# Patient Record
Sex: FEMALE | Race: WHITE | Employment: UNEMPLOYED | URBAN - METROPOLITAN AREA
[De-identification: names, ages, dates, MRNs, and addresses within clinical notes are randomized per-mention and may not be internally consistent; named-entity substitution may affect disease eponyms.]

---

## 2018-06-13 ENCOUNTER — HOSPITAL ENCOUNTER (OUTPATIENT)
Dept: MAMMOGRAPHY | Age: 33
Discharge: HOME | End: 2018-06-13
Attending: OBSTETRICS & GYNECOLOGY
Payer: COMMERCIAL

## 2018-06-13 DIAGNOSIS — N63.0 LUMP, BREAST: ICD-10-CM

## 2018-06-13 PROCEDURE — 77066 DX MAMMO INCL CAD BI: CPT

## 2018-06-13 PROCEDURE — 76642 ULTRASOUND BREAST LIMITED: CPT

## 2018-06-25 ENCOUNTER — HOSPITAL ENCOUNTER (OUTPATIENT)
Dept: MAMMOGRAPHY | Age: 33
Discharge: HOME | End: 2018-06-25
Attending: OBSTETRICS & GYNECOLOGY
Payer: COMMERCIAL

## 2018-06-25 VITALS — DIASTOLIC BLOOD PRESSURE: 85 MMHG | HEART RATE: 101 BPM | SYSTOLIC BLOOD PRESSURE: 131 MMHG

## 2018-06-25 DIAGNOSIS — N63.0 BREAST MASS: ICD-10-CM

## 2018-06-25 DIAGNOSIS — N63.20 LEFT BREAST MASS: ICD-10-CM

## 2018-06-25 PROCEDURE — 19083 BX BREAST 1ST LESION US IMAG: CPT

## 2018-06-25 PROCEDURE — 77065 DX MAMMO INCL CAD UNI: CPT

## 2018-06-25 PROCEDURE — 74011000250 HC RX REV CODE- 250: Performed by: OBSTETRICS & GYNECOLOGY

## 2018-06-25 PROCEDURE — 88305 TISSUE EXAM BY PATHOLOGIST: CPT | Performed by: OBSTETRICS & GYNECOLOGY

## 2018-06-25 RX ORDER — LIDOCAINE HYDROCHLORIDE 20 MG/ML
3 INJECTION, SOLUTION INFILTRATION; PERINEURAL
Status: COMPLETED | OUTPATIENT
Start: 2018-06-25 | End: 2018-06-25

## 2018-06-25 RX ADMIN — LIDOCAINE HYDROCHLORIDE 60 MG: 20 INJECTION, SOLUTION INFILTRATION; PERINEURAL at 10:18

## 2018-10-15 ENCOUNTER — HOSPITAL ENCOUNTER (OUTPATIENT)
Dept: MAMMOGRAPHY | Age: 33
Discharge: HOME | End: 2018-10-15
Attending: NURSE PRACTITIONER
Payer: COMMERCIAL

## 2018-10-15 DIAGNOSIS — N64.4 BREAST PAIN, RIGHT: ICD-10-CM

## 2018-10-15 DIAGNOSIS — N63.0 BREAST LUMP: ICD-10-CM

## 2018-10-15 PROCEDURE — 77066 DX MAMMO INCL CAD BI: CPT

## 2018-10-15 PROCEDURE — 76642 ULTRASOUND BREAST LIMITED: CPT

## 2021-12-17 ENCOUNTER — HOSPITAL ENCOUNTER (OUTPATIENT)
Dept: MAMMOGRAPHY | Age: 36
Discharge: HOME | End: 2021-12-17
Attending: OBSTETRICS & GYNECOLOGY
Payer: COMMERCIAL

## 2021-12-17 DIAGNOSIS — N63.20 LEFT BREAST LUMP: ICD-10-CM

## 2021-12-17 PROCEDURE — 76642 ULTRASOUND BREAST LIMITED: CPT

## 2021-12-17 PROCEDURE — 77066 DX MAMMO INCL CAD BI: CPT

## 2023-10-16 SDOH — ECONOMIC STABILITY: FOOD INSECURITY: WITHIN THE PAST 12 MONTHS, YOU WORRIED THAT YOUR FOOD WOULD RUN OUT BEFORE YOU GOT MONEY TO BUY MORE.: NEVER TRUE

## 2023-10-16 SDOH — ECONOMIC STABILITY: FOOD INSECURITY: WITHIN THE PAST 12 MONTHS, THE FOOD YOU BOUGHT JUST DIDN'T LAST AND YOU DIDN'T HAVE MONEY TO GET MORE.: NEVER TRUE

## 2023-10-16 SDOH — ECONOMIC STABILITY: INCOME INSECURITY: HOW HARD IS IT FOR YOU TO PAY FOR THE VERY BASICS LIKE FOOD, HOUSING, MEDICAL CARE, AND HEATING?: NOT HARD AT ALL

## 2023-10-16 SDOH — ECONOMIC STABILITY: HOUSING INSECURITY
IN THE LAST 12 MONTHS, WAS THERE A TIME WHEN YOU DID NOT HAVE A STEADY PLACE TO SLEEP OR SLEPT IN A SHELTER (INCLUDING NOW)?: NO

## 2023-10-16 SDOH — ECONOMIC STABILITY: TRANSPORTATION INSECURITY
IN THE PAST 12 MONTHS, HAS LACK OF TRANSPORTATION KEPT YOU FROM MEETINGS, WORK, OR FROM GETTING THINGS NEEDED FOR DAILY LIVING?: NO

## 2023-10-18 ENCOUNTER — OFFICE VISIT (OUTPATIENT)
Dept: OBGYN CLINIC | Age: 38
End: 2023-10-18
Payer: COMMERCIAL

## 2023-10-18 VITALS
SYSTOLIC BLOOD PRESSURE: 110 MMHG | WEIGHT: 111.5 LBS | HEIGHT: 65 IN | BODY MASS INDEX: 18.58 KG/M2 | DIASTOLIC BLOOD PRESSURE: 62 MMHG

## 2023-10-18 DIAGNOSIS — Z01.419 WELL WOMAN EXAM: Primary | ICD-10-CM

## 2023-10-18 DIAGNOSIS — Z12.4 SCREENING FOR MALIGNANT NEOPLASM OF CERVIX: ICD-10-CM

## 2023-10-18 DIAGNOSIS — Z11.51 SCREENING FOR HUMAN PAPILLOMAVIRUS (HPV): ICD-10-CM

## 2023-10-18 DIAGNOSIS — Z13.89 SCREENING FOR GENITOURINARY CONDITION: ICD-10-CM

## 2023-10-18 LAB
BILIRUBIN, URINE, POC: NEGATIVE
BLOOD URINE, POC: NEGATIVE
GLUCOSE URINE, POC: NEGATIVE
KETONES, URINE, POC: NEGATIVE
LEUKOCYTE ESTERASE, URINE, POC: NORMAL
NITRITE, URINE, POC: NEGATIVE
PH, URINE, POC: 7 (ref 4.6–8)
PROTEIN,URINE, POC: NEGATIVE
SPECIFIC GRAVITY, URINE, POC: 1.02 (ref 1–1.03)
URINALYSIS CLARITY, POC: CLEAR
URINALYSIS COLOR, POC: YELLOW
UROBILINOGEN, POC: NORMAL

## 2023-10-18 PROCEDURE — 81002 URINALYSIS NONAUTO W/O SCOPE: CPT | Performed by: OBSTETRICS & GYNECOLOGY

## 2023-10-18 PROCEDURE — 99395 PREV VISIT EST AGE 18-39: CPT | Performed by: OBSTETRICS & GYNECOLOGY

## 2023-10-18 NOTE — PROGRESS NOTES
HPI: Ms. Blue Swartz is a 45 y.o. female No obstetric history on file. who is here today for a well woman exam. She complains of nothing. They are fostering to adopt a 3yo. She is overall doing well. Has seen pcp and had annual exam this year to prepare for adoption. 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 GYN History     Patient's last menstrual period was 2023 (exact date). Cycle Length 28 Lasting 5-7  mild dysmenorrhea; negative postcoital bleeding    Sexually active using partner vasectomy for birth control     Past Medical History:   Diagnosis Date    Abnormal Papanicolaou smear of cervix     CAD (coronary artery disease)     Gluten intolerance     Thrombocytopenia ([address]) 12/3/2014     Past Surgical History:   Procedure Laterality Date    BREAST BIOPSY Left 2018    Fibrocystic breast disease    CHOLECYSTECTOMY  2013    OTHER SURGICAL HISTORY      colon bx     US BREAST BIOPSY W LOC DEVICE 1ST LESION LEFT Left 2018    US BREAST NEEDLE BIOPSY LEFT 2018 [facility]    WISDOM TOOTH EXTRACTION         Allergies   Allergen Reactions    Wheat Bran Nausea And Vomiting       No current outpatient medications on file. No current facility-administered medications for this visit.          Social History     Socioeconomic History    Marital status:  -15 years      Spouse name: Not on file    Number of children: Not on file    Years of education: Not on file    Highest education level: Not on file

## 2023-10-27 LAB
CYTOLOGIST CVX/VAG CYTO: NORMAL
CYTOLOGY CVX/VAG DOC THIN PREP: NORMAL
HPV APTIMA: NEGATIVE
Lab: NORMAL
Lab: NORMAL
PATH REPORT.FINAL DX SPEC: NORMAL
STAT OF ADQ CVX/VAG CYTO-IMP: NORMAL

## 2025-01-13 NOTE — PROGRESS NOTES
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   Tobacco Use    Smoking status: Never    Smokeless tobacco: Never   Vaping Use    Vaping status: Never Used   Substance and Sexual Activity    Alcohol use: Yes    Drug use: No    Sexual activity: Yes     Birth control/protection: None     Comment:  had a Vasectomy   Other Topics Concern    Exercise:     Social History Narrative    Not on file     Social Determinants of Health     Financial Resource Strain: Not on file   Food Insecurity: Not on file (10/16/2023)   Transportation Needs: Not on file   Physical Activity: Not on file   Stress: Not on file   Social Connections: Unknown (3/20/2021)    Received from [facility]    Social Connections     Frequency of Communication with Friends and Family: Not asked     Frequency of Social Gatherings with Friends and Family: Not asked   Intimate Partner Violence: Unknown (3/20/2021)    Received from [facility]    Intimate Partner Violence     Fear of Current or Ex-Partner: Not asked     Emotionally Abused: Not asked     Physically Abused: Not asked     Sexually Abused: Not asked   Housing Stability: Not on file     Family History   Problem Relation Age of Onset    No Known Problems Paternal Grandmother     No Known Problems Paternal Grandfather     Dementia Maternal Grandmother         Possibility    Stroke Father     No Known Problems Mother     No Known Problems Maternal Grandfather        Review of Systems  Neg except hpi        /72   Ht 1.651 m (5' 5\")   Wt 52.3 kg (115 lb 6.4 oz)   LMP 12/27/2024   BMI 19.20 kg/m²        Physical Exam  Constitutional:       General: She is not in acute distress.     Appearance: She is well-developed.   HENT:      Head: Normocephalic and atraumatic.   Neck:      Thyroid: No thyroid mass or thyromegaly.   Cardiovascular:      Rate and Rhythm: Normal rate and regular rhythm.

## 2025-01-14 ENCOUNTER — OFFICE VISIT (OUTPATIENT)
Dept: OBGYN CLINIC | Age: 40
End: 2025-01-14
Payer: COMMERCIAL

## 2025-01-14 VITALS
SYSTOLIC BLOOD PRESSURE: 100 MMHG | BODY MASS INDEX: 19.22 KG/M2 | WEIGHT: 115.4 LBS | DIASTOLIC BLOOD PRESSURE: 72 MMHG | HEIGHT: 65 IN

## 2025-01-14 DIAGNOSIS — Z12.31 SCREENING MAMMOGRAM FOR BREAST CANCER: ICD-10-CM

## 2025-01-14 DIAGNOSIS — Z01.419 WELL WOMAN EXAM: Primary | ICD-10-CM

## 2025-01-14 DIAGNOSIS — Z11.51 SCREENING FOR HUMAN PAPILLOMAVIRUS (HPV): ICD-10-CM

## 2025-01-14 DIAGNOSIS — Z12.4 SCREENING FOR CERVICAL CANCER: ICD-10-CM

## 2025-01-14 DIAGNOSIS — Z13.89 SCREENING FOR GENITOURINARY CONDITION: ICD-10-CM

## 2025-01-14 LAB
BILIRUBIN, URINE, POC: NEGATIVE
BLOOD URINE, POC: NEGATIVE
GLUCOSE URINE, POC: NEGATIVE
KETONES, URINE, POC: NEGATIVE
LEUKOCYTE ESTERASE, URINE, POC: NORMAL
NITRITE, URINE, POC: NEGATIVE
PH, URINE, POC: 7.5 (ref 4.6–8)
PROTEIN,URINE, POC: NEGATIVE
SPECIFIC GRAVITY, URINE, POC: 1.01 (ref 1–1.03)
URINALYSIS CLARITY, POC: CLEAR
URINALYSIS COLOR, POC: YELLOW
UROBILINOGEN, POC: NORMAL MG/DL

## 2025-01-14 PROCEDURE — 81002 URINALYSIS NONAUTO W/O SCOPE: CPT | Performed by: OBSTETRICS & GYNECOLOGY

## 2025-01-14 PROCEDURE — 99395 PREV VISIT EST AGE 18-39: CPT | Performed by: OBSTETRICS & GYNECOLOGY

## 2025-01-14 SDOH — ECONOMIC STABILITY: FOOD INSECURITY: WITHIN THE PAST 12 MONTHS, THE FOOD YOU BOUGHT JUST DIDN'T LAST AND YOU DIDN'T HAVE MONEY TO GET MORE.: NEVER TRUE

## 2025-01-14 SDOH — ECONOMIC STABILITY: INCOME INSECURITY: IN THE LAST 12 MONTHS, WAS THERE A TIME WHEN YOU WERE NOT ABLE TO PAY THE MORTGAGE OR RENT ON TIME?: NO

## 2025-01-14 SDOH — ECONOMIC STABILITY: FOOD INSECURITY: WITHIN THE PAST 12 MONTHS, YOU WORRIED THAT YOUR FOOD WOULD RUN OUT BEFORE YOU GOT MONEY TO BUY MORE.: NEVER TRUE

## 2025-01-14 SDOH — ECONOMIC STABILITY: TRANSPORTATION INSECURITY
IN THE PAST 12 MONTHS, HAS THE LACK OF TRANSPORTATION KEPT YOU FROM MEDICAL APPOINTMENTS OR FROM GETTING MEDICATIONS?: NO

## 2025-01-17 LAB
COLLECTION METHOD: NORMAL
CYTOLOGIST CVX/VAG CYTO: NORMAL
CYTOLOGY CVX/VAG DOC THIN PREP: NORMAL
DATE OF LMP: NORMAL
HPV APTIMA: NEGATIVE
Lab: NORMAL
PAP SOURCE: NORMAL
PATH REPORT.FINAL DX SPEC: NORMAL
PREV TREATMENT: NORMAL
STAT OF ADQ CVX/VAG CYTO-IMP: NORMAL

## 2025-06-17 NOTE — PROGRESS NOTES
Melina  is a 39 y.o. female, .  Patient's last menstrual period was 2025 (exact date)., who is being seen for irregular cycles.  She will have bleeding 3 days then nothing and then start again.      Per pt message: cycles range from 15-34 days between cycles, menorrhagia, severe Has.  The worse part are the headaches.      US Findings from Today (2025):  GYN U/S SECONDARY TO: Irregular cycles, intermenstrual spotting.   CX: Appears WNL   UTERUS: Anteverted and heterogenous. Dilated vessels noted in periphery of uterus, c/w pelvic congestion syndrome.   ENDO= 9.0 mm, no intracavity masses visualized.   ROV: WNL, simple exophytic cyst measuring 1.3 x 1.3 x 1.2   LOV: Simple cyst measuring 1.9 x 1.7 x 2.2 cm   No adn masses or free fluid visualized. Dilated pelvic vessels noted in both adn, again c/w pelvic congestion syndrome.   Uterine Volume: 126.597 cm³   Her us is really normal but increased blood supply.        HISTORY:    OB History          3    Para   3    Term   3            AB        Living   3         SAB        IAB        Ectopic        Molar        Multiple        Live Births   3          Obstetric Comments   Vaginal delivery x 3             GYN History         Sexual History:   Social History     Substance and Sexual Activity   Sexual Activity Yes    Partners: Male    Birth control/protection: None    Comment:  had a Vasectomy          has a past medical history of Abnormal Papanicolaou smear of cervix, CAD (coronary artery disease), Gluten intolerance, and Thrombocytopenia. .    Past Surgical History:   Procedure Laterality Date    BREAST BIOPSY Left 2018    Fibrocystic breast disease    CHOLECYSTECTOMY  2013    OTHER SURGICAL HISTORY      colon bx     US BREAST BIOPSY W LOC DEVICE 1ST LESION LEFT Left 2018    US BREAST NEEDLE BIOPSY LEFT 2018 [facility]    WISDOM TOOTH EXTRACTION         Her current meds are:    Current

## 2025-06-18 ENCOUNTER — PROCEDURE VISIT (OUTPATIENT)
Dept: OBGYN CLINIC | Age: 40
End: 2025-06-18
Payer: COMMERCIAL

## 2025-06-18 ENCOUNTER — OFFICE VISIT (OUTPATIENT)
Dept: OBGYN CLINIC | Age: 40
End: 2025-06-18
Payer: COMMERCIAL

## 2025-06-18 VITALS
HEIGHT: 65 IN | DIASTOLIC BLOOD PRESSURE: 82 MMHG | WEIGHT: 111.1 LBS | BODY MASS INDEX: 18.51 KG/M2 | SYSTOLIC BLOOD PRESSURE: 124 MMHG

## 2025-06-18 DIAGNOSIS — N92.6 IRREGULAR MENSES: Primary | ICD-10-CM

## 2025-06-18 DIAGNOSIS — N92.6 IRREGULAR PERIODS/MENSTRUAL CYCLES: Primary | ICD-10-CM

## 2025-06-18 DIAGNOSIS — N92.0 INTERMENSTRUAL SPOTTING: ICD-10-CM

## 2025-06-18 PROCEDURE — 99214 OFFICE O/P EST MOD 30 MIN: CPT | Performed by: OBSTETRICS & GYNECOLOGY

## 2025-06-18 PROCEDURE — 76830 TRANSVAGINAL US NON-OB: CPT | Performed by: OBSTETRICS & GYNECOLOGY

## 2025-06-18 RX ORDER — DROSPIRENONE 4 MG/1
1 TABLET, FILM COATED ORAL DAILY
Qty: 90 TABLET | Refills: 3 | Status: SHIPPED | OUTPATIENT
Start: 2025-06-18

## 2025-07-21 ENCOUNTER — APPOINTMENT (OUTPATIENT)
Dept: URBAN - METROPOLITAN AREA CLINIC 330 | Facility: CLINIC | Age: 40
Setting detail: DERMATOLOGY
End: 2025-07-21

## 2025-07-21 DIAGNOSIS — L81.4 OTHER MELANIN HYPERPIGMENTATION: ICD-10-CM

## 2025-07-21 DIAGNOSIS — D22 MELANOCYTIC NEVI: ICD-10-CM

## 2025-07-21 DIAGNOSIS — L82.1 OTHER SEBORRHEIC KERATOSIS: ICD-10-CM

## 2025-07-21 DIAGNOSIS — D18.0 HEMANGIOMA: ICD-10-CM

## 2025-07-21 DIAGNOSIS — L57.8 OTHER SKIN CHANGES DUE TO CHRONIC EXPOSURE TO NONIONIZING RADIATION: ICD-10-CM

## 2025-07-21 PROBLEM — D22.5 MELANOCYTIC NEVI OF TRUNK: Status: ACTIVE | Noted: 2025-07-21

## 2025-07-21 PROBLEM — D18.01 HEMANGIOMA OF SKIN AND SUBCUTANEOUS TISSUE: Status: ACTIVE | Noted: 2025-07-21

## 2025-07-21 PROCEDURE — ? TREATMENT REGIMEN

## 2025-07-21 PROCEDURE — ? COUNSELING

## 2025-07-21 ASSESSMENT — LOCATION DETAILED DESCRIPTION DERM
LOCATION DETAILED: LEFT DISTAL DORSAL FOREARM
LOCATION DETAILED: RIGHT DISTAL DORSAL FOREARM
LOCATION DETAILED: RIGHT PROXIMAL DORSAL FOREARM
LOCATION DETAILED: INFERIOR THORACIC SPINE
LOCATION DETAILED: RIGHT MEDIAL MALAR CHEEK
LOCATION DETAILED: SUPERIOR THORACIC SPINE
LOCATION DETAILED: LEFT PROXIMAL DORSAL FOREARM
LOCATION DETAILED: RIGHT CENTRAL FRONTAL SCALP
LOCATION DETAILED: LEFT INFERIOR CENTRAL MALAR CHEEK
LOCATION DETAILED: LEFT PROXIMAL PRETIBIAL REGION

## 2025-07-21 ASSESSMENT — LOCATION SIMPLE DESCRIPTION DERM
LOCATION SIMPLE: RIGHT CHEEK
LOCATION SIMPLE: LEFT CHEEK
LOCATION SIMPLE: LEFT PRETIBIAL REGION
LOCATION SIMPLE: SCALP
LOCATION SIMPLE: UPPER BACK
LOCATION SIMPLE: RIGHT FOREARM
LOCATION SIMPLE: LEFT FOREARM

## 2025-07-21 ASSESSMENT — LOCATION ZONE DERM
LOCATION ZONE: ARM
LOCATION ZONE: FACE
LOCATION ZONE: SCALP
LOCATION ZONE: TRUNK
LOCATION ZONE: LEG

## 2025-07-21 NOTE — PROCEDURE: MIPS QUALITY
Quality 402: Tobacco Use And Help With Quitting Among Adolescents: Patient screened for tobacco and never smoked
Detail Level: Detailed
Quality 226: Preventive Care And Screening: Tobacco Use: Screening And Cessation Intervention: Patient screened for tobacco use and is an ex/non-smoker
Quality 431: Preventive Care And Screening: Unhealthy Alcohol Use - Screening: Patient not identified as an unhealthy alcohol user when screened for unhealthy alcohol use using a systematic screening method
Quality 130: Documentation Of Current Medications In The Medical Record: Current Medications Documented

## 2025-07-21 NOTE — HPI: EVALUATION OF SKIN LESION(S)
What Type Of Note Output Would You Prefer (Optional)?: Bullet Format
Hpi Title: Evaluation of Skin Lesions
How Severe Are Your Spot(S)?: mild
Have Your Spot(S) Been Treated In The Past?: has not been treated
Additional History: New spot on right scalp she noticed in April

## 2025-08-11 ENCOUNTER — HOSPITAL ENCOUNTER (OUTPATIENT)
Dept: MAMMOGRAPHY | Age: 40
Discharge: HOME | End: 2025-08-14
Attending: OBSTETRICS & GYNECOLOGY
Payer: COMMERCIAL

## 2025-08-11 DIAGNOSIS — Z12.31 SCREENING MAMMOGRAM FOR BREAST CANCER: ICD-10-CM

## 2025-08-11 DIAGNOSIS — Z01.419 WELL WOMAN EXAM: ICD-10-CM

## 2025-08-11 PROCEDURE — 77063 BREAST TOMOSYNTHESIS BI: CPT

## 2025-08-13 ENCOUNTER — RESULTS FOLLOW-UP (OUTPATIENT)
Dept: OBGYN CLINIC | Age: 40
End: 2025-08-13

## 2025-08-13 DIAGNOSIS — R92.30 DENSE BREAST TISSUE: Primary | ICD-10-CM
